# Patient Record
Sex: FEMALE | Race: WHITE | Employment: OTHER | URBAN - METROPOLITAN AREA
[De-identification: names, ages, dates, MRNs, and addresses within clinical notes are randomized per-mention and may not be internally consistent; named-entity substitution may affect disease eponyms.]

---

## 2017-04-20 ENCOUNTER — HOSPITAL ENCOUNTER (EMERGENCY)
Age: 72
Discharge: HOME | End: 2017-04-20
Attending: EMERGENCY MEDICINE
Payer: MEDICARE

## 2017-04-20 VITALS
RESPIRATION RATE: 16 BRPM | BODY MASS INDEX: 21.14 KG/M2 | OXYGEN SATURATION: 100 % | TEMPERATURE: 97.6 F | HEIGHT: 61 IN | HEART RATE: 79 BPM | WEIGHT: 112 LBS

## 2017-04-20 DIAGNOSIS — R04.0 EPISTAXIS: Primary | ICD-10-CM

## 2017-04-20 LAB
ANION GAP BLD CALC-SCNC: 7 MMOL/L (ref 3–18)
BASOPHILS # BLD AUTO: 0 K/UL (ref 0–0.06)
BASOPHILS # BLD: 0 % (ref 0–2)
BUN SERPL-MCNC: 23 MG/DL (ref 7–18)
BUN/CREAT SERPL: 17 (ref 12–20)
CALCIUM SERPL-MCNC: 9 MG/DL (ref 8.5–10.1)
CHLORIDE SERPL-SCNC: 105 MMOL/L (ref 100–108)
CO2 SERPL-SCNC: 28 MMOL/L (ref 21–32)
CREAT SERPL-MCNC: 1.39 MG/DL (ref 0.6–1.3)
DIFFERENTIAL METHOD BLD: ABNORMAL
EOSINOPHIL # BLD: 0.1 K/UL (ref 0–0.4)
EOSINOPHIL NFR BLD: 2 % (ref 0–5)
ERYTHROCYTE [DISTWIDTH] IN BLOOD BY AUTOMATED COUNT: 15.5 % (ref 11.6–14.5)
GLUCOSE SERPL-MCNC: 108 MG/DL (ref 74–99)
HCT VFR BLD AUTO: 37.1 % (ref 35–45)
HGB BLD-MCNC: 12.3 G/DL (ref 12–16)
INR PPP: 2.8 (ref 0.8–1.2)
LYMPHOCYTES # BLD AUTO: 40 % (ref 21–52)
LYMPHOCYTES # BLD: 1.9 K/UL (ref 0.9–3.6)
MCH RBC QN AUTO: 32.5 PG (ref 24–34)
MCHC RBC AUTO-ENTMCNC: 33.2 G/DL (ref 31–37)
MCV RBC AUTO: 98.1 FL (ref 74–97)
MONOCYTES # BLD: 0.3 K/UL (ref 0.05–1.2)
MONOCYTES NFR BLD AUTO: 7 % (ref 3–10)
NEUTS SEG # BLD: 2.5 K/UL (ref 1.8–8)
NEUTS SEG NFR BLD AUTO: 51 % (ref 40–73)
PLATELET # BLD AUTO: 205 K/UL (ref 135–420)
PMV BLD AUTO: 9.4 FL (ref 9.2–11.8)
POTASSIUM SERPL-SCNC: 3.7 MMOL/L (ref 3.5–5.5)
PROTHROMBIN TIME: 28.4 SEC (ref 11.5–15.2)
RBC # BLD AUTO: 3.78 M/UL (ref 4.2–5.3)
SODIUM SERPL-SCNC: 140 MMOL/L (ref 136–145)
WBC # BLD AUTO: 4.8 K/UL (ref 4.6–13.2)

## 2017-04-20 PROCEDURE — 77030011647 HC PK NSL MEROCL MEDT -A

## 2017-04-20 PROCEDURE — 85610 PROTHROMBIN TIME: CPT | Performed by: EMERGENCY MEDICINE

## 2017-04-20 PROCEDURE — 75810000284 HC CNTRL NASAL HEMORHRAGE SIMPLE

## 2017-04-20 PROCEDURE — 80048 BASIC METABOLIC PNL TOTAL CA: CPT | Performed by: EMERGENCY MEDICINE

## 2017-04-20 PROCEDURE — 99282 EMERGENCY DEPT VISIT SF MDM: CPT

## 2017-04-20 PROCEDURE — 77030011649 HC PK NSL RHNO S&N -B

## 2017-04-20 PROCEDURE — 74011250637 HC RX REV CODE- 250/637: Performed by: EMERGENCY MEDICINE

## 2017-04-20 PROCEDURE — 85025 COMPLETE CBC W/AUTO DIFF WBC: CPT | Performed by: EMERGENCY MEDICINE

## 2017-04-20 RX ORDER — CEPHALEXIN 500 MG/1
500 CAPSULE ORAL 4 TIMES DAILY
Qty: 28 CAP | Refills: 0 | Status: SHIPPED | OUTPATIENT
Start: 2017-04-20 | End: 2017-04-27

## 2017-04-20 RX ORDER — SODIUM CHLORIDE 0.9 % (FLUSH) 0.9 %
5-10 SYRINGE (ML) INJECTION EVERY 8 HOURS
Status: DISCONTINUED | OUTPATIENT
Start: 2017-04-20 | End: 2017-04-20 | Stop reason: HOSPADM

## 2017-04-20 RX ORDER — SODIUM CHLORIDE 0.9 % (FLUSH) 0.9 %
5-10 SYRINGE (ML) INJECTION AS NEEDED
Status: DISCONTINUED | OUTPATIENT
Start: 2017-04-20 | End: 2017-04-20 | Stop reason: HOSPADM

## 2017-04-20 RX ORDER — OXYMETAZOLINE HCL 0.05 %
2 SPRAY, NON-AEROSOL (ML) NASAL
Status: COMPLETED | OUTPATIENT
Start: 2017-04-20 | End: 2017-04-20

## 2017-04-20 RX ORDER — CEPHALEXIN 500 MG/1
500 CAPSULE ORAL 4 TIMES DAILY
Qty: 20 CAP | Refills: 0 | Status: SHIPPED | OUTPATIENT
Start: 2017-04-20 | End: 2017-04-25

## 2017-04-20 RX ADMIN — OXYMETAZOLINE HYDROCHLORIDE 2 SPRAY: 5 SPRAY NASAL at 00:27

## 2017-04-20 NOTE — ED PROVIDER NOTES
HPI Comments: 12:17 AM   Gadiel Jenkins is a 70 y.o. female presenting to the ED C/O epistaxis from both nares onset an hour ago. Pt reports she has had two previous episodes earlier this week. Pt states she has been coughing up blood clots and reports it is difficult to breathe. Pt has been using Afrin and pressure to no relief. Pt is on Coumadin daily. PMHx of A Fib, GERD, right breast cancer, mitral valve prolapse and gastroparesis. Pt denies vomiting and any other Sx or complaints. Patient is a 70 y.o. female presenting with epistaxis. The history is provided by the patient. No  was used. Epistaxis    This is a new problem. The current episode started 1 to 2 hours ago (1 hour ago). The problem occurs constantly. The problem has not changed since onset. The problem is associated with an unknown factor. The bleeding has been from both nares. She has tried applying pressure and vasoconstrictors (Afrin) for the symptoms. The treatment provided no relief. Her past medical history is significant for frequent nosebleeds. Written by MICHELLE Mccall, as dictated by Renny Hager. Kenneth Cowart MD    Past Medical History:   Diagnosis Date    A-fib [facility])     Arrhythmia     A-fib    Arrhythmia     Cancer ([facility] [address]     right breast    Gastroparesis     GERD (gastroesophageal reflux disease)     Mitral valve prolapse     Nausea & vomiting        Past Surgical History:   Procedure Laterality Date    HX BREAST RECONSTRUCTION  80's ,2004    HX BREAST RECONSTRUCTION Left 11/10/2016    CAPSULOTOMY LEFT BREAST AND REPLACE GEL IMPLANT performed by Rupert Callahan MD at [address]  2010    HX HYSTERECTOMY      HX LUMBAR LAMINECTOMY       [address]    bilateral         History reviewed. No pertinent family history.     Social History     Social History    Marital status:      Spouse name: N/A    Number of children: N/A    Years of education: N/A Occupational History    Not on file. Social History Main Topics    Smoking status: Never Smoker    Smokeless tobacco: Not on file    Alcohol use No    Drug use: No    Sexual activity: Not on file     Other Topics Concern    Not on file     Social History Narrative         ALLERGIES: Latex; Codeine; and Sulfa (sulfonamide antibiotics)    Review of Systems   HENT: Positive for nosebleeds. Gastrointestinal: Negative for vomiting. All other systems reviewed and are negative. Vitals:    04/20/17 0010   Pulse: 79   Resp: 16   Temp: 97.6 °F (36.4 °C)   SpO2: 100%   Weight: 50.8 kg (112 lb)   Height: 5' 1\" (1.549 m)            Physical Exam   Constitutional: She is oriented to person, place, and time. She appears well-developed and well-nourished. No distress. HENT:   Head: Normocephalic and atraumatic. Right Ear: External ear normal.   Left Ear: External ear normal.   Nose: Epistaxis is observed. Mouth/Throat: Oropharynx is clear and moist. No oropharyngeal exudate. Continuous perfuse bleeding from right nasal passage unable to truly ascertain true nasal exam due to copious bleeding even after Afrin and irrigation. Eyes: Conjunctivae and EOM are normal. Pupils are equal, round, and reactive to light. No scleral icterus. No pallor   Neck: Normal range of motion. Neck supple. No JVD present. No tracheal deviation present. No thyromegaly present. Cardiovascular: Normal rate, regular rhythm and normal heart sounds. Pulmonary/Chest: Effort normal and breath sounds normal. No stridor. No respiratory distress. Abdominal: Soft. Bowel sounds are normal. She exhibits no distension. There is no tenderness. There is no rebound and no guarding. Musculoskeletal: Normal range of motion. She exhibits no edema or tenderness. No soft tissue injuries   Lymphadenopathy:     She has no cervical adenopathy. Neurological: She is alert and oriented to person, place, and time.  She has normal reflexes. No cranial nerve deficit. Coordination normal.   Skin: Skin is warm and dry. Bruising noted. No rash noted. She is not diaphoretic. Right arm: large visible bruise medial to antecubital fossa. Psychiatric: Her behavior is normal. Judgment and thought content normal. Her mood appears anxious (Appropriately anxious affect). Nursing note and vitals reviewed. RESULTS:    PULSE OXIMETRY NOTE:  12:23 AM   Pulse-ox is 100% on RA  Interpretation: Normal   Intervention: None       No orders to display        Labs Reviewed   CBC WITH AUTOMATED DIFF - Abnormal; Notable for the following:        Result Value    RBC 3.78 (*)     MCV 98.1 (*)     RDW 15.5 (*)     All other components within normal limits   PROTHROMBIN TIME + INR - Abnormal; Notable for the following:     Prothrombin time 28.4 (*)     INR 2.8 (*)     All other components within normal limits   METABOLIC PANEL, BASIC - Abnormal; Notable for the following:     Glucose 108 (*)     BUN 23 (*)     Creatinine 1.39 (*)     GFR est AA 45 (*)     GFR est non-AA 37 (*)     All other components within normal limits       Recent Results (from the past 12 hour(s))   CBC WITH AUTOMATED DIFF    Collection Time: 04/20/17 12:58 AM   Result Value Ref Range    WBC 4.8 4.6 - 13.2 K/uL    RBC 3.78 (L) 4.20 - 5.30 M/uL    HGB 12.3 12.0 - 16.0 g/dL    HCT 37.1 35.0 - 45.0 %    MCV 98.1 (H) 74.0 - 97.0 FL    MCH 32.5 24.0 - 34.0 PG    MCHC 33.2 31.0 - 37.0 g/dL    RDW 15.5 (H) 11.6 - 14.5 %    PLATELET 330 843 - 210 K/uL    MPV 9.4 9.2 - 11.8 FL    NEUTROPHILS 51 40 - 73 %    LYMPHOCYTES 40 21 - 52 %    MONOCYTES 7 3 - 10 %    EOSINOPHILS 2 0 - 5 %    BASOPHILS 0 0 - 2 %    ABS. NEUTROPHILS 2.5 1.8 - 8.0 K/UL    ABS. LYMPHOCYTES 1.9 0.9 - 3.6 K/UL    ABS. MONOCYTES 0.3 0.05 - 1.2 K/UL    ABS. EOSINOPHILS 0.1 0.0 - 0.4 K/UL    ABS.  BASOPHILS 0.0 0.0 - 0.06 K/UL    DF AUTOMATED     PROTHROMBIN TIME + INR    Collection Time: 04/20/17 12:58 AM   Result Value Ref Range Prothrombin time 28.4 (H) 11.5 - 15.2 sec    INR 2.8 (H) 0.8 - 1.2     METABOLIC PANEL, BASIC    Collection Time: 04/20/17 12:58 AM   Result Value Ref Range    Sodium 140 136 - 145 mmol/L    Potassium 3.7 3.5 - 5.5 mmol/L    Chloride 105 100 - 108 mmol/L    CO2 28 21 - 32 mmol/L    Anion gap 7 3.0 - 18 mmol/L    Glucose 108 (H) 74 - 99 mg/dL    BUN 23 (H) 7.0 - 18 MG/DL    Creatinine 1.39 (H) 0.6 - 1.3 MG/DL    BUN/Creatinine ratio 17 12 - 20      GFR est AA 45 (L) >60 ml/min/1.73m2    GFR est non-AA 37 (L) >60 ml/min/1.73m2    Calcium 9.0 8.5 - 10.1 MG/DL      MDM  Number of Diagnoses or Management Options  Epistaxis:   Diagnosis management comments: DDx: No visible sight for bleeding but coagulopathy from Coumadin sufficient for excessive bleeding. Labs sent for anemia or severe INR levels. ED Course     Medications   sodium chloride (NS) flush 5-10 mL (not administered)   sodium chloride (NS) flush 5-10 mL (not administered)   oxymetazoline (AFRIN) 0.05 % nasal spray 2 Spray (2 Sprays Both Nostrils Given by Provider 4/20/17 4131)      Epistaxis Management  Date/Time: 4/20/2017 12:30 AM  Performed by: Tien Cummins  Authorized by: Mina GALO     Consent:     Consent obtained:  Verbal    Consent given by:  Patient    Risks discussed:  Bleeding, nasal injury and pain  Procedure details:     Treatment site:  L anterior and R anterior    Treatment method:  Merocel sponge, nasal tampon and nasal balloon  Post-procedure details:     Assessment:  Bleeding decreased    Patient tolerance of procedure: Tolerated well, no immediate complications        PROGRESS NOTE:  12:17 AM  Initial assessment performed. Written by MICHELLE Ghosh, as dictated by Xavier Russ. Salvador Causey MD     PROGRESS NOTE:   1:08 AM  We tried Merocel with intranasal straw but it was too large and peripheral bleeding persisted. We tried Merocel long length trimmed with continuous oozing.  That was exchanged for a non-inflatable rhino rocket which temporarily stopped bleeding but persistent sneezing caused persistent bleeding. Switched out for intranasal balloon rocket which finally worked. She states she still has some discomfort but feels better. Written by Cirilo Urbano ED Scrib[employer], as dictated by Jessica Malave. Kiel Ayala MD .    DISCHARGE NOTE:  2:52 AM   Gertrude Brambila  results have been reviewed with her. She has been counseled regarding her diagnosis, treatment, and plan. She verbally conveys understanding and agreement of the signs, symptoms, diagnosis, treatment and prognosis and additionally agrees to follow up as discussed. She also agrees with the care-plan and conveys that all of her questions have been answered. I have also provided discharge instructions for her that include: educational information regarding their diagnosis and treatment, and list of reasons why they would want to return to the ED prior to their follow-up appointment, should her condition change. The patient and/or family has been provided with education for proper Emergency Department utilization. CLINICAL IMPRESSION:    1. Epistaxis        PLAN: DISCHARGE HOME    Follow-up Information     Follow up With Details Comments Contact Info    Indy Gonsalez MD Schedule an appointment as soon as possible for a visit in 2 days Follow up with ENT doctor [address] [facility]      Venkat Duffy MD Schedule an appointment as soon as possible for a visit in 2 days Follow up with your primary care physician [address] [place] [address]      THE [facility] EMERGENCY DEPT Go to As needed, If symptoms worsen [address] [address]  [phone]          Current Discharge Medication List      START taking these medications    Details   !! cephALEXin (KEFLEX) 500 mg capsule Take 1 Cap by mouth four (4) times daily for 7 days.   Qty: 28 Cap, Refills: 0      !! cephALEXin (KEFLEX) 500 mg capsule Take 1 Cap by mouth four (4) times daily for 5 days. Qty: 20 Cap, Refills: 0       !! - Potential duplicate medications found. Please discuss with provider. CONTINUE these medications which have NOT CHANGED    Details   dilTIAZem CD (CARTIA XT) 180 mg ER capsule Take 180 mg by mouth daily. pantoprazole (PROTONIX) 40 mg tablet Take 40 mg by mouth daily. Indications: GASTROESOPHAGEAL REFLUX      !! warfarin (COUMADIN) 5 mg tablet Take 5 mg by mouth three (3) days a week. Sunday ,tuesday ,thursday      !! warfarin (COUMADIN) 2.5 mg tablet Take 2.5 mg by mouth every Monday, Wednesday, Friday, Saturday. pregabalin (LYRICA) 75 mg capsule Take 75 mg by mouth daily. escitalopram oxalate (LEXAPRO) 10 mg tablet Take 10 mg by mouth nightly. Indications: GENERALIZED ANXIETY DISORDER      simvastatin (ZOCOR) 10 mg tablet Take 10 mg by mouth nightly. Indications: hypercholesterolemia      fluticasone (FLONASE) 50 mcg/actuation nasal spray 2 Sprays by Both Nostrils route as needed for Rhinitis. solifenacin (VESICARE) 10 mg tablet Take 10 mg by mouth nightly. Indications: BLADDER HYPERACTIVITY      valACYclovir (VALTREX) 1 gram tablet Take 1,000 mg by mouth daily. cholecalciferol (VITAMIN D3) 1,000 unit cap Take 1,000 Units by mouth daily. !! - Potential duplicate medications found. Please discuss with provider. ATTESTATIONS:  This note is prepared by Levar Alfaro, acting as Scribe for Shea. Myriam Pena MD .    [employer]. Myriam Pena MD : The scribe's documentation has been prepared under my direction and personally reviewed by me in its entirety.  I confirm that the note above accurately reflects all work, treatment, procedures, and medical decision making performed by me.        6:30 AM  24 April 2017  Addendum:  I was able to contact Dr. Clementina Chavis while driving home from work that same morning at roughly 930 AM.  I also spoke with the ENT office manager at 21  AM before leaving the hospital.  They both stated they will get her in for f/u on Friday the  21st of April.   Gloria Pollock MD

## 2017-04-20 NOTE — ED TRIAGE NOTES
Pt reports to ED c/c epistaxis onset 1 hour. Pt reports bleeding from both nares, also reports currently taking Coumadin PO daily.

## 2017-04-20 NOTE — ED NOTES
Patient armband removed and shredded I have reviewed discharge instructions with the patient and spouse. The patient and spouse verbalized understanding. 1 e-script given. Ambulates with steady gait to front lobby.

## 2017-04-20 NOTE — DISCHARGE INSTRUCTIONS
Nosebleeds: Care Instructions  Your Care Instructions    Nosebleeds are common, especially if you have colds or allergies. Many things can cause a nosebleed. Some nosebleeds stop on their own with pressure. Others need packing. Some get cauterized (sealed). If you have gauze or other packing materials in your nose, you will need to follow up with your doctor to have the packing removed. You may need more treatment if you get nosebleeds a lot. The doctor has checked you carefully, but problems can develop later. If you notice any problems or new symptoms, get medical treatment right away. Follow-up care is a key part of your treatment and safety. Be sure to make and go to all appointments, and call your doctor if you are having problems. It's also a good idea to know your test results and keep a list of the medicines you take. How can you care for yourself at home? · If you get another nosebleed:  ¨ Sit up and tilt your head slightly forward. This keeps blood from going down your throat. ¨ Use your thumb and index finger to pinch your nose shut for 10 minutes. Use a clock. Do not check to see if the bleeding has stopped before the 10 minutes are up. If the bleeding has not stopped, pinch your nose shut for another 10 minutes. ¨ When the bleeding has stopped, try not to pick, rub, or blow your nose for 12 hours. Avoiding these things helps keep your nose from bleeding again. · If your doctor prescribed antibiotics, take them as directed. Do not stop taking them just because you feel better. You need to take the full course of antibiotics. To prevent nosebleeds  · Do not blow your nose too hard. · Try not to lift or strain after a nosebleed. · Raise your head on a pillow while you sleep. · Put a thin layer of a saline- or water-based nasal gel, such as NasoGel, inside your nose. Put it on the septum, which divides your nostrils. This will prevent dryness that can cause nosebleeds.   · Use a vaporizer or humidifier to add moisture to your bedroom. Follow the directions for cleaning the machine. · Do not use aspirin, ibuprofen (Advil, Motrin), or naproxen (Aleve) for 36 to 48 hours after a nosebleed unless your doctor tells you to. You can use acetaminophen (Tylenol) for pain relief. · Talk to your doctor about stopping any other medicines you are taking. Some medicines may make you more likely to get a nosebleed. · Do not use cold medicines or nasal sprays without first talking to your doctor. They can make your nose dry. When should you call for help? Call 911 anytime you think you may need emergency care. For example, call if:  · You passed out (lost consciousness). Call your doctor now or seek immediate medical care if:  · You get another nosebleed and your nose is still bleeding after you have applied pressure 3 times for 10 minutes each time (30 minutes total). · There is a lot of blood running down the back of your throat even after you pinch your nose and tilt your head forward. · You have a fever. · You have sinus pain. Watch closely for changes in your health, and be sure to contact your doctor if:  · You get nosebleeds often, even if they stop. · You do not get better as expected. Where can you learn more? Go to http://warren-aurora.info/. Enter S156 in the search box to learn more about \"Nosebleeds: Care Instructions. \"  Current as of: May 27, 2016  Content Version: 11.2  © 6407-7223 [employer]. Care instructions adapted under license by [employer] (which disclaims liability or warranty for this information). If you have questions about a medical condition or this instruction, always ask your healthcare professional. [address] any warranty or liability for your use of this information.

## 2017-05-23 ENCOUNTER — HOSPITAL ENCOUNTER (OUTPATIENT)
Dept: ULTRASOUND IMAGING | Age: 72
Discharge: HOME | End: 2017-05-23
Attending: NURSE PRACTITIONER

## 2017-05-23 DIAGNOSIS — C50.919 BREAST CANCER (HCC): ICD-10-CM

## 2017-06-01 ENCOUNTER — HOSPITAL ENCOUNTER (OUTPATIENT)
Age: 72
Discharge: HOME | End: 2017-06-01
Attending: NURSE PRACTITIONER
Payer: MEDICARE

## 2017-06-01 DIAGNOSIS — Z85.3 PERSONAL HISTORY OF MALIGNANT NEOPLASM OF BREAST: ICD-10-CM

## 2017-06-01 LAB — CREAT UR-MCNC: 1.2 MG/DL (ref 0.6–1.3)

## 2017-06-01 PROCEDURE — 77059 MRI BREAST BI W WO CONT: CPT

## 2017-06-01 PROCEDURE — 74011250636 HC RX REV CODE- 250/636

## 2017-06-01 PROCEDURE — 82565 ASSAY OF CREATININE: CPT

## 2017-06-01 PROCEDURE — A9585 GADOBUTROL INJECTION: HCPCS

## 2017-06-01 RX ADMIN — GADOBUTROL 5.5 ML: 604.72 INJECTION INTRAVENOUS at 11:00

## 2017-07-17 ENCOUNTER — HOSPITAL ENCOUNTER (OUTPATIENT)
Dept: LAB | Age: 72
Discharge: HOME | End: 2017-07-17
Payer: MEDICARE

## 2017-07-17 PROCEDURE — 88311 DECALCIFY TISSUE: CPT | Performed by: OTOLARYNGOLOGY

## 2017-07-17 PROCEDURE — 88304 TISSUE EXAM BY PATHOLOGIST: CPT | Performed by: OTOLARYNGOLOGY

## 2018-01-05 ENCOUNTER — HOSPITAL ENCOUNTER (OUTPATIENT)
Dept: GENERAL RADIOLOGY | Age: 73
Discharge: HOME | End: 2018-01-05
Attending: OTOLARYNGOLOGY
Payer: MEDICARE

## 2018-01-05 DIAGNOSIS — R13.10 DYSPHAGIA: ICD-10-CM

## 2018-01-05 PROCEDURE — 74220 X-RAY XM ESOPHAGUS 1CNTRST: CPT

## 2018-01-05 PROCEDURE — 74011000250 HC RX REV CODE- 250: Performed by: OTOLARYNGOLOGY

## 2018-01-05 PROCEDURE — 74011000255 HC RX REV CODE- 255: Performed by: OTOLARYNGOLOGY

## 2018-01-05 RX ADMIN — ANTACID/ANTIFLATULENT 4 G: 380; 550; 10; 10 GRANULE, EFFERVESCENT ORAL at 11:05

## 2018-01-05 RX ADMIN — BARIUM SULFATE 135 ML: 980 POWDER, FOR SUSPENSION ORAL at 11:06

## 2018-01-05 RX ADMIN — BARIUM SULFATE 176 G: 960 POWDER, FOR SUSPENSION ORAL at 11:06
